# Patient Record
Sex: FEMALE | Race: WHITE | NOT HISPANIC OR LATINO | Employment: FULL TIME | URBAN - METROPOLITAN AREA
[De-identification: names, ages, dates, MRNs, and addresses within clinical notes are randomized per-mention and may not be internally consistent; named-entity substitution may affect disease eponyms.]

---

## 2017-09-05 ENCOUNTER — OFFICE VISIT (OUTPATIENT)
Dept: URGENT CARE | Facility: CLINIC | Age: 42
End: 2017-09-05

## 2017-09-05 VITALS
BODY MASS INDEX: 29.62 KG/M2 | HEART RATE: 95 BPM | OXYGEN SATURATION: 86 % | HEIGHT: 69 IN | WEIGHT: 200 LBS | RESPIRATION RATE: 16 BRPM | SYSTOLIC BLOOD PRESSURE: 119 MMHG | DIASTOLIC BLOOD PRESSURE: 76 MMHG | TEMPERATURE: 98 F

## 2017-09-05 DIAGNOSIS — L60.0 INGROWN LEFT BIG TOENAIL: Primary | ICD-10-CM

## 2017-09-05 PROCEDURE — 11765 WEDGE EXCISION SKN NAIL FOLD: CPT | Mod: S$GLB,,, | Performed by: FAMILY MEDICINE

## 2017-09-05 PROCEDURE — 3008F BODY MASS INDEX DOCD: CPT | Mod: S$GLB,,, | Performed by: FAMILY MEDICINE

## 2017-09-05 PROCEDURE — 99203 OFFICE O/P NEW LOW 30 MIN: CPT | Mod: S$GLB,,, | Performed by: FAMILY MEDICINE

## 2017-09-05 RX ORDER — MUPIROCIN 20 MG/G
OINTMENT TOPICAL 3 TIMES DAILY
Qty: 1 TUBE | Refills: 0 | Status: SHIPPED | OUTPATIENT
Start: 2017-09-05 | End: 2017-09-15

## 2017-09-05 RX ORDER — SULFAMETHOXAZOLE AND TRIMETHOPRIM 800; 160 MG/1; MG/1
1 TABLET ORAL 2 TIMES DAILY
Qty: 20 TABLET | Refills: 0 | Status: SHIPPED | OUTPATIENT
Start: 2017-09-05 | End: 2017-09-15

## 2017-09-05 RX ORDER — IBUPROFEN 800 MG/1
800 TABLET ORAL 3 TIMES DAILY
Qty: 30 TABLET | Refills: 0 | Status: SHIPPED | OUTPATIENT
Start: 2017-09-05 | End: 2017-09-15

## 2017-09-05 NOTE — PROGRESS NOTES
"Follow up with your doctor in a few days as needed.  Return to the urgent care or go to the ER if symptoms get worse.    Stef Zeng MD  Subjective:       Patient ID: Susie Johnson is a 42 y.o. female.    Vitals:  height is 5' 9" (1.753 m) and weight is 90.7 kg (200 lb). Her oral temperature is 98.3 °F (36.8 °C). Her blood pressure is 119/76 and her pulse is 95. Her respiration is 16 and oxygen saturation is 86% (abnormal).     Chief Complaint: Ingrown Toenail    Ingrown Toenail   This is a new problem. The current episode started 1 to 4 weeks ago. The problem occurs constantly. The problem has been gradually worsening. Pertinent negatives include no abdominal pain, chest pain, chills, fever, headaches, nausea, rash, sore throat or vomiting. The symptoms are aggravated by bending and standing. Treatments tried: pt states "tried cutting the infection out. The treatment provided no relief.   Toe Pain    The incident occurred more than 1 week ago. There was no injury mechanism. The quality of the pain is described as burning. The pain is at a severity of 7/10. The symptoms are aggravated by weight bearing. The treatment provided no relief.     Review of Systems   Constitution: Negative for chills and fever.   HENT: Negative for sore throat.    Eyes: Negative for blurred vision.   Cardiovascular: Negative for chest pain.   Respiratory: Negative for shortness of breath.    Skin: Negative for rash.   Musculoskeletal: Negative for back pain and joint pain.   Gastrointestinal: Negative for abdominal pain, diarrhea, nausea and vomiting.   Neurological: Negative for headaches.   Psychiatric/Behavioral: The patient is not nervous/anxious.        Objective:      Physical Exam   Constitutional: She is oriented to person, place, and time. She appears well-developed and well-nourished.   HENT:   Head: Normocephalic and atraumatic.   Eyes: EOM are normal. Pupils are equal, round, and reactive to light.   Neck: Normal range of " motion. Neck supple. No JVD present. No tracheal deviation present. No thyromegaly present.   Cardiovascular: Normal rate, regular rhythm and normal heart sounds.  Exam reveals no gallop and no friction rub.    No murmur heard.  Pulmonary/Chest: Breath sounds normal. No respiratory distress. She has no wheezes. She has no rales. She exhibits no tenderness.   Abdominal: Soft. Bowel sounds are normal. She exhibits no distension and no mass. There is no tenderness. There is no rebound and no guarding. No hernia.   Musculoskeletal: Normal range of motion. She exhibits no edema, tenderness or deformity.        Feet:    Lymphadenopathy:     She has no cervical adenopathy.   Neurological: She is alert and oriented to person, place, and time. She displays normal reflexes. No cranial nerve deficit. She exhibits normal muscle tone. Coordination normal.   Skin: Skin is warm. Capillary refill takes less than 2 seconds.   Psychiatric: She has a normal mood and affect. Her behavior is normal. Judgment and thought content normal.   Vitals reviewed.      Assessment:       1. Ingrown left big toenail        Plan:         Ingrown left big toenail  -     sulfamethoxazole-trimethoprim 800-160mg (BACTRIM DS) 800-160 mg Tab; Take 1 tablet by mouth 2 (two) times daily.  Dispense: 20 tablet; Refill: 0  -     mupirocin (BACTROBAN) 2 % ointment; Apply topically 3 (three) times daily. Apply to the affected area  Dispense: 1 Tube; Refill: 0  -     ibuprofen (ADVIL,MOTRIN) 800 MG tablet; Take 1 tablet (800 mg total) by mouth 3 (three) times daily.  Dispense: 30 tablet; Refill: 0  -     Nail Removal    Other orders  -     Cancel: Nail Removal      Follow up with your doctor in a few days as needed.  Return to the urgent care or go to the ER if symptoms get worse.    Stef Zeng MD

## 2017-09-06 NOTE — PATIENT INSTRUCTIONS
Ingrown Toenail (Excised)  An ingrown toenail occurs when the nail grows sideways into the skin alongside the nail. This can cause pain and may lead to an infection with redness, swelling, and sometimes drainage.  Cause  The most common cause of an ingrown toenail is trimmin your toenails wrong. Most people trim the nails too close to the skin and try to round the nail too tightly around the shape of the toe. When you do this, the nail can grow into the skin of the toe. While it may look nice, your toenail can grow into the skin and cause infection.  Other causes include injury or wearing shoes that are too short or tight. This can cause the same problem that happens when trimming your toenails. Sometimes you are born with a toenail that grows too large for your toe.  Symptoms  The most common symptoms of an ingrown toenail include:  · Pain  · Redness  · Swelling  · Drainage  Treatment  It's important to treat an ingrown toenail as soon as you notice there is a problem. The longer you wait to do something, the worse it is likely to get. Sometimes it gets worse quickly. Other times it may take awhile. It can even feel better for awhile, and then get worse.  Inflammation  If the infection is mild, you may be able to take care of it at home. Home care includes:  · Frequent warm water soaks  · Keeping the nail clean  · Wearing loose, comfortable shoes or open toe sandals  Another method to help the toe heal is to use a small piece of cotton or waxed dental floss to gently lift the corner of the problem nail. Change the cotton or floss frequently, especially if it gets dirty.  Infection  If your infection is mild but home care isn't working, or the toenail is getting worse, see your health care provider. Signs of worsening infection include:  · Swelling  · Redness   · Pus drainage  In some cases, part of the toenail needs to be removed by your health care provider so that the infection can be drained.  If there is a  lot of redness and swelling, then an antibiotic may also be used. The redness and pain should go away within 48 hours. It will take about 2 weeks for the exposed nail bed to become dry and for the swelling to go down.  If only the side of the nail was removed, it will begin to grow back in a few months. To prevent recurrence, sometimes the side of nail bed may be treated with a strong chemical to prevent the nail from growing back.  Home care  Wound care  1) Twice a day for the first three days, clean and soak the toe as follows:  · Soak your foot in a tub of warm water for 5 minutes. Or, hold your toe under a faucet of warm running water for 5 minutes.  · Clean any remaining crust away with soap and water using a cotton swab.  · Put a small amount of antibiotic ointment on the infected area.  · Cover with a bandage until the exposed nail bed is dry and there is no more drainage.  2) Change the dressing or bandage every time you soak or clean it, or whenever it becomes wet or dirty.  3) If you were prescribed antibiotics, take them as directed until they are all gone.  4) Wear comfortable shoes with a lot of toe room, or open-toe sandals, while your toe is healing.  Medications  · You can take acetaminophen or ibuprofen for pain, unless you were given a different pain medicine to use. If you have chronic liver or kidney disease, or have ever had a stomach ulcer or gastrointestinal bleeding, or are taking blood thinner medications, talk with your doctor before using these medicines.  · If you were given antibiotics, take them until they are all gone. It is important to finish the antibiotics even if the wound looks better to make sure the infection clears.  Prevention  To prevent ingrown toenails:  1) Wear shoes that fit well. Avoid shoes that pinch the toes together.  2) When you trim your toenails, do not cut them too short. Cut straight across at the top and do not round the edges.  3) Do not use a sharp object to  clean under your nail since this might cause an infection.  4) If the toenail starts to grow into the skin again, put a small piece of cotton under that side of the nail to help it grow out straight.  Follow-up care  Follow up with your doctor or this facility as advised by our staff. If the ingrown toenail recurs, follow up with a podiatrist for nail bed ablation.  When to seek medical care  Get prompt medical attention if any of the following occur:  · Increasing redness, pain or swelling of the toe  · Red streaks in the skin leading away from the wound  · Continued pus or fluid drainage for more than 24 hours  · Fever of 100.4º F (38º C) or higher, or as directed by your health care provider  Date Last Reviewed: 3/10/2014  © 3984-1608 [employer]. [address] [place] [address], [place] [address]. All rights reserved. This information is not intended as a substitute for professional medical care. Always follow your healthcare professional's instructions.    Follow up with your doctor in a few days as needed.  Return to the urgent care or go to the ER if symptoms get worse.    Stef Zeng MD

## 2017-09-06 NOTE — PROCEDURES
Nail Removal  Date/Time: 9/5/2017 7:27 PM  Performed by: MARTHA HICKEY  Authorized by: MARTHA HICKEY       Location:  Left foot  Location detail:  Left big toe  Anesthesia:  Digital block  Local anesthetic: lidocaine 1% without epinephrine  Anesthetic total (ml):  5  Preparation:  Skin prepped with Betadine    Amount removed:  1/5  Side:  Ulnar  Wedge excision of skin of nail fold: Yes    Nail bed sutured?: No    Nail matrix removed:  None  Dressing applied:  4x4 and antibiotic ointment  Patient tolerance:  Patient tolerated the procedure well with no immediate complications

## 2017-09-08 ENCOUNTER — TELEPHONE (OUTPATIENT)
Dept: URGENT CARE | Facility: CLINIC | Age: 42
End: 2017-09-08

## 2019-08-28 ENCOUNTER — OCCUPATIONAL HEALTH (OUTPATIENT)
Dept: URGENT CARE | Facility: CLINIC | Age: 44
End: 2019-08-28

## 2019-08-28 DIAGNOSIS — Z02.1 PRE-EMPLOYMENT DRUG SCREENING: Primary | ICD-10-CM

## 2019-08-28 LAB
CTP QC/QA: YES
POC 10 PANEL DRUG SCREEN: NEGATIVE

## 2019-08-28 PROCEDURE — 80305 POCT RAPID DRUG SCREEN 10 PANEL: ICD-10-PCS | Mod: QW,S$GLB,, | Performed by: FAMILY MEDICINE

## 2019-08-28 PROCEDURE — 80305 DRUG TEST PRSMV DIR OPT OBS: CPT | Mod: QW,S$GLB,, | Performed by: FAMILY MEDICINE

## 2019-08-28 NOTE — PROGRESS NOTES
Subjective:       Patient ID: Susie Johnson is a 44 y.o. female.    Chief Complaint: Drug / Alcohol Assessment (Pre Employment [facility])    Patient presents today for a Pre Employment Drug Screening    Drug / Alcohol Assessment       ROS    Objective:      Physical Exam    Assessment:       No diagnosis found.    Plan:                   No follow-ups on file.

## 2020-04-09 ENCOUNTER — NURSE TRIAGE (OUTPATIENT)
Dept: ADMINISTRATIVE | Facility: CLINIC | Age: 45
End: 2020-04-09

## 2020-04-09 NOTE — TELEPHONE ENCOUNTER
28 days of being on her period  Started getting heavy yesterday  And Today increasing where she is changing a pad every 2 hours  Pain rated 8/10 on pain scale when it's bad, right now pain is 6/10  Recommended to be seen within next four hours. She says she will think about it but will probably wait.  Reason for Disposition   [1] Constant abdominal pain AND [2] present > 2 hours    Additional Information   Negative: Shock suspected (e.g., cold/pale/clammy skin, too weak to stand, low BP, rapid pulse)   Negative: Difficult to awaken or acting confused  (e.g., disoriented, slurred speech)   Negative: Passed out (i.e., lost consciousness, collapsed and was not responding)   Negative: Sounds like a life-threatening emergency to the triager   Negative: Followed a genital area injury   Negative: Pregnant > 20 weeks  (5 months or more)   Negative: Pregnant < 20 weeks  (less than 5 months)   Negative: Bleeding occurring > 12 months after menopause   Negative: Bleeding from sexual abuse or rape   Negative: [1] Vaginal discharge is main symptom AND [2] small amount of blood   Negative: SEVERE abdominal pain   Negative: SEVERE dizziness (e.g., unable to stand, requires support to walk, feels like passing out now)   Negative: SEVERE vaginal bleeding (i.e., soaking 2 pads or tampons per hour and present 2 or more hours)   Negative: Patient sounds very sick or weak to the triager   Negative: MODERATE vaginal bleeding (i.e., soaking 1 pad or tampon per hour and present > 6 hours)    Protocols used: ST VAGINAL BLEEDING - [id number]

## 2020-10-31 ENCOUNTER — OCCUPATIONAL HEALTH (OUTPATIENT)
Dept: URGENT CARE | Facility: CLINIC | Age: 45
End: 2020-10-31
Payer: OTHER MISCELLANEOUS

## 2020-10-31 DIAGNOSIS — Z02.1 PRE-EMPLOYMENT EXAMINATION: Primary | ICD-10-CM

## 2020-10-31 LAB
CTP QC/QA: YES
POC 10 PANEL DRUG SCREEN: NEGATIVE

## 2020-10-31 PROCEDURE — 80305 DRUG TEST PRSMV DIR OPT OBS: CPT | Mod: QW,S$GLB,, | Performed by: INTERNAL MEDICINE

## 2020-10-31 PROCEDURE — 80305 POCT RAPID DRUG SCREEN 10 PANEL: ICD-10-PCS | Mod: QW,S$GLB,, | Performed by: INTERNAL MEDICINE

## 2020-10-31 NOTE — PROGRESS NOTES
Rapid 10 drug screen on 10/31/2020 at 12:00 pm noon. Authorization was faxed to [employer]. Pt tolerated well.

## 2021-05-10 ENCOUNTER — PATIENT MESSAGE (OUTPATIENT)
Dept: RESEARCH | Facility: HOSPITAL | Age: 46
End: 2021-05-10

## 2022-10-10 ENCOUNTER — HOSPITAL ENCOUNTER (OUTPATIENT)
Dept: RADIOLOGY | Facility: HOSPITAL | Age: 47
Discharge: HOME | End: 2022-10-10
Attending: NURSE PRACTITIONER
Payer: COMMERCIAL

## 2022-10-10 DIAGNOSIS — Z12.31 ENCOUNTER FOR SCREENING MAMMOGRAM FOR BREAST CANCER: ICD-10-CM

## 2022-10-10 PROCEDURE — 77067 SCR MAMMO BI INCL CAD: CPT | Mod: 26,,, | Performed by: RADIOLOGY

## 2022-10-10 PROCEDURE — 77063 BREAST TOMOSYNTHESIS BI: CPT | Mod: TC

## 2022-10-10 PROCEDURE — 77063 BREAST TOMOSYNTHESIS BI: CPT | Mod: 26,,, | Performed by: RADIOLOGY

## 2022-10-10 PROCEDURE — 77063 MAMMO DIGITAL SCREENING BILAT WITH TOMO: ICD-10-PCS | Mod: 26,,, | Performed by: RADIOLOGY

## 2022-10-10 PROCEDURE — 77067 MAMMO DIGITAL SCREENING BILAT WITH TOMO: ICD-10-PCS | Mod: 26,,, | Performed by: RADIOLOGY
